# Patient Record
Sex: FEMALE | Race: BLACK OR AFRICAN AMERICAN
[De-identification: names, ages, dates, MRNs, and addresses within clinical notes are randomized per-mention and may not be internally consistent; named-entity substitution may affect disease eponyms.]

---

## 2018-09-21 ENCOUNTER — HOSPITAL ENCOUNTER (EMERGENCY)
Dept: HOSPITAL 74 - JER | Age: 58
Discharge: HOME | End: 2018-09-21
Payer: COMMERCIAL

## 2018-09-21 VITALS — DIASTOLIC BLOOD PRESSURE: 77 MMHG | SYSTOLIC BLOOD PRESSURE: 131 MMHG | HEART RATE: 79 BPM | TEMPERATURE: 97 F

## 2018-09-21 VITALS — BODY MASS INDEX: 30.9 KG/M2
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
## 2018-09-21 DIAGNOSIS — S63.501A: Primary | ICD-10-CM
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
## 2018-09-21 NOTE — PDOC
Rapid Medical Evaluation


Time Seen by Provider: 09/21/18 18:35


Medical Evaluation: 





09/21/18 18:35


I have performed a brief in-person evaluation of this patient.





The patient presents with a chief complaint of: right wrist pain x4 days. 

Patient noticed some swelling in her right wrist.


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 No numbness or tingling 

sensation. Patient is right handed.





Pertinent physical exam findings: Right wrist decreased R.O.M>/pain. +Slight 

swelling


Right hand: Cap refill <2sec


Strength 5+/5





I have ordered the following: right wrist





The patient will proceed to the ED for further evaluation.





**Discharge Disposition





- Referrals


Referrals: 


Lorena Dixon MD [Primary Care Provider] - 





- Patient Instructions





- Post Discharge Activity

## 2018-09-21 NOTE — PDOC
History of Present Illness





- General


Chief Complaint: Pain


Stated Complaint: WRIST PAIN AND SWELLING


Time Seen by Provider: 09/21/18 18:35


History Source: Patient


Exam Limitations: Clinical Condition





- History of Present Illness


Initial Comments: 





09/21/18 19:10


Patient with no significant past medical history present with complain of 

persistent right wrist pain over 4 days now which is worse with lifting. 

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 Denies any trauma or 

injury to wrists. Patient reported mild swelling to right wrist and hand . 

Denies any other symptoms


Timing/Duration: getting worse (4 days)





Past History





- Past Medical History


Allergies/Adverse Reactions: 


 Allergies











Allergy/AdvReac Type Severity Reaction Status Date / Time


 


No Known Allergies Allergy   Verified 09/21/18 18:38











Home Medications: 


Ambulatory Orders





Arm Brace [Wrist Brace] 1 each [facility] DAILY #1 each 09/21/18 


Naproxen 500 mg PO BID PRN #30 tablet 09/21/18 








COPD: No





- Suicide/Smoking/Psychosocial Hx


Smoking History: Never smoked





**Review of Systems





- Review of Systems


Able to Perform ROS?: Yes


Is the patient limited English proficient: No


Constitutional: No: Weakness


Respiratory: No: Symptoms reported


Cardiac (ROS): No: Symptoms Reported


ABD/GI: No: Symptoms Reported


Musculoskeletal: Yes: See HPI, Joint Pain (right wrist), Joint Swelling (right 

wrist), Muscle Pain (right wrist).  No: Muscle Weakness


All Other Systems: Reviewed and Negative





*Physical Exam





- Vital Signs


 Last Vital Signs











Temp Pulse Resp BP Pulse Ox


 


 97 F L  79   18   131/77   98 


 


 09/21/18 18:35  09/21/18 18:35  09/21/18 18:35  09/21/18 18:35  09/21/18 18:35














- Physical Exam


Comments: 





09/21/18 19:13


GENERAL:


Well developed, well nourished. Awake and alert. No acute distress.


CARDIOVASCULAR:


Regular rate and rhythm. No murmurs, rubs, or gallops. 


PULMONARY: 


No evidence of respiratory distress. Lungs clear to auscultation bilaterally. 

No wheezing, rales or rhonchi.


ABDOMINAL:


Soft. Non-tender. Non-distended. No rebound or guarding. No organomegaly. 

Normoactive bowel sounds


MUSCULOSKELETAL : Mild tenderness to palpation over carpal tunnel which is 

worse with flexion and extension of right wrist. Mild swelling over radial 

aspect of right wrist. No bony deformities 


EXTREMITIES: 


No cyanosis. No clubbing. No edema. No calf tenderness.


SKIN: 


Warm and dry. Normal capillary refill. No rashes. No jaundice. 


NEUROLOGICAL: 


Alert, awake, appropriate.  No motor deficits in the  lower extremities.  Gait 

is normal without ataxia.


PSYCHIATRIC: 


Cooperative. Good eye contact. Appropriate mood and affect.


General Appearance: Yes: Nourished, Appropriately Dressed.  No: Apparent 

Distress





Medical Decision Making





- Medical Decision Making





09/21/18 19:03


Patient with no significant past medical history present with complain of 

persistent right wrist pain without trauma or injury which has been going on 

for 4 days now. Exam significant for mild tenderness over carpal tunnel and 

radial aspect of right wrist which is worse with flexion and extension of right 

wrist. X-ray of right wrist shows no acute pathology. Patient stable for home 

discharge on NSAIDs and wrist brace with orthopedics follow-up. Patient advised 

to rest wrist





*DC/Admit/Observation/Transfer


Diagnosis at time of Disposition: 


Sprain of right wrist


Qualifiers:


 Encounter type: initial encounter Qualified Code(s): S63.501A - Unspecified 

sprain of right wrist, initial encounter








- Discharge Dispostion


Disposition: HOME


Condition at time of disposition: Stable


Decision to Admit order: No





- Prescriptions


Prescriptions: 


Arm Brace [Wrist Brace] 1 each [facility] DAILY #1 each


Naproxen 500 mg PO BID PRN #30 tablet


 PRN Reason: wrist pain





- Referrals


Referrals: 


Lorena Dixon MD [Primary Care Provider] - 


Tiago Funes MD [Staff Physician] - 





- Patient Instructions


Printed Discharge Instructions:  DI for Wrist Sprain


Additional Instructions: 


Take medication as prescribed as needed for pain. Rest right wrist and where 

prescribed brace daily until symptoms resolve. Follow up with preferred 

orthopedics if symptoms persist for more than 5 days





- Post Discharge Activity


Forms/Work/School Notes:  Back to Work